# Patient Record
Sex: FEMALE | NOT HISPANIC OR LATINO
[De-identification: names, ages, dates, MRNs, and addresses within clinical notes are randomized per-mention and may not be internally consistent; named-entity substitution may affect disease eponyms.]

---

## 2019-07-22 ENCOUNTER — APPOINTMENT (OUTPATIENT)
Dept: RHEUMATOLOGY | Facility: CLINIC | Age: 64
End: 2019-07-22
Payer: COMMERCIAL

## 2019-07-22 VITALS
HEART RATE: 69 BPM | BODY MASS INDEX: 23.54 KG/M2 | RESPIRATION RATE: 17 BRPM | WEIGHT: 150 LBS | SYSTOLIC BLOOD PRESSURE: 122 MMHG | DIASTOLIC BLOOD PRESSURE: 72 MMHG | TEMPERATURE: 98.8 F | OXYGEN SATURATION: 98 % | HEIGHT: 67 IN

## 2019-07-22 DIAGNOSIS — Z87.891 PERSONAL HISTORY OF NICOTINE DEPENDENCE: ICD-10-CM

## 2019-07-22 DIAGNOSIS — Z86.39 PERSONAL HISTORY OF OTHER ENDOCRINE, NUTRITIONAL AND METABOLIC DISEASE: ICD-10-CM

## 2019-07-22 DIAGNOSIS — Z82.61 FAMILY HISTORY OF ARTHRITIS: ICD-10-CM

## 2019-07-22 DIAGNOSIS — Z80.8 FAMILY HISTORY OF MALIGNANT NEOPLASM OF OTHER ORGANS OR SYSTEMS: ICD-10-CM

## 2019-07-22 PROCEDURE — 99245 OFF/OP CONSLTJ NEW/EST HI 55: CPT

## 2019-07-22 RX ORDER — ERGOCALCIFEROL 1.25 MG/1
1.25 MG CAPSULE, LIQUID FILLED ORAL
Refills: 0 | Status: ACTIVE | COMMUNITY
Start: 2019-07-22

## 2019-07-22 NOTE — ASSESSMENT
[FreeTextEntry1] : 63 year old female presents with joint pain, swelling, and stiffness, suggestive of an inflammatory arthritis, possibly rheumatoid arthritis given the distribution of affected joints and her family history (grandmother w/ RA).  I have therefore ordered some more bloodwork as further workup.  I have also requested a copy of her recent x-rays for review.  She will follow up with me again in 2-3 weeks to review her results. In the meantime, she will try taking naproxen to help alleviate her symptoms.\par Pt also reports a hx of osteoporosis, for which she was treated previously with alendronate, though she has been off it now for several years.  I have therefore requested a copy of her most recent DEXA for review.  I will plan to discuss this further with her at her next visit.

## 2019-07-22 NOTE — DATA REVIEWED
[FreeTextEntry1] : x-rays of B/L wrists (5/9/19):  unremarkable\par \par requested copies of prior x-rays of hands and DEXA

## 2019-07-22 NOTE — CONSULT LETTER
[Dear  ___] : Dear  [unfilled], [Consult Letter:] : I had the pleasure of evaluating your patient, [unfilled]. [Please see my note below.] : Please see my note below. [Consult Closing:] : Thank you very much for allowing me to participate in the care of this patient.  If you have any questions, please do not hesitate to contact me. [Sincerely,] : Sincerely, [FreeTextEntry3] : Vel Lizarraga MD\par Rheumatology\par [facility]\par  of Medicine\par [employer] [facility] \par \par [address]\[place] [address]\par phone:  [phone]\par fax:      [phone]\par \par [address] [place] [address]\[place] [address]\par phone:  [phone]\par fax:      [phone]\par

## 2019-07-22 NOTE — HISTORY OF PRESENT ILLNESS
[Arthralgias] : arthralgias [Joint Swelling] : joint swelling [Morning Stiffness] : morning stiffness [FreeTextEntry1] : 63 year old female with PMHx as listed below reports that she has been experiencing joint pains, primarily in her hands (worst in her B/L 3rd PIP's R>L) for the past 2 years.  The pain is intermittent, worst in the mornings, and with activity.  She describes the pain as dull, 8 out of 10.  (+)swelling, amee in the B/L 3rd PIP's.  (+)AM stiffness, usually lasting about 3 hours.  She gets some relief from rubbing her hands with alcohol.  No other known alleviating factors (she has not tried any analgesics).\par No F/C, no unintentional weight loss, no night sweats, no oral ulcers, no rashes, no alopecia, no photosensitivity, no dry eyes/dry mouth, no Raynaud symptoms, no focal weakness, no dysphagia  [Weight Loss] : no weight loss [Anorexia] : no anorexia [Malaise] : no malaise [Fever] : no fever [Chills] : no chills [Fatigue] : no fatigue [Malar Facial Rash] : no malar facial rash [Skin Lesions] : no lesions [Skin Nodules] : no skin nodules [Oral Ulcers] : no oral ulcers [Cough] : no cough [Dry Mouth] : no dry mouth [Dysphagia] : no dysphagia [Shortness of Breath] : no shortness of breath [Chest Pain] : no chest pain [Joint Warmth] : no joint warmth [Joint Deformity] : no joint deformity [Decreased ROM] : no decreased range of motion [Falls] : no falls [Dyspnea] : no dyspnea [Myalgias] : no myalgias [Muscle Weakness] : no muscle weakness [Muscle Spasms] : no muscle spasms [Muscle Cramping] : no muscle cramping [Visual Changes] : no visual changes [Eye Pain] : no eye pain [Eye Redness] : no eye redness [Dry Eyes] : no dry eyes

## 2019-07-22 NOTE — PHYSICAL EXAM
[General Appearance - Alert] : alert [General Appearance - In No Acute Distress] : in no acute distress [Sclera] : the sclera and conjunctiva were normal [Outer Ear] : the ears and nose were normal in appearance [Oropharynx] : the oropharynx was normal [Neck Appearance] : the appearance of the neck was normal [Neck Cervical Mass (___cm)] : no neck mass was observed [Jugular Venous Distention Increased] : there was no jugular-venous distention [Thyroid Diffuse Enlargement] : the thyroid was not enlarged [Thyroid Nodule] : there were no palpable thyroid nodules [Auscultation Breath Sounds / Voice Sounds] : lungs were clear to auscultation bilaterally [Heart Sounds] : normal S1 and S2 [Heart Rate And Rhythm] : heart rate was normal and rhythm regular [Heart Sounds Gallop] : no gallops [Murmurs] : no murmurs [Bowel Sounds] : normal bowel sounds [Heart Sounds Pericardial Friction Rub] : no pericardial rub [Edema] : there was no peripheral edema [Abdomen Soft] : soft [Abdomen Tenderness] : non-tender [Abdomen Mass (___ Cm)] : no abdominal mass palpated [Cervical Lymph Nodes Enlarged Posterior Bilaterally] : posterior cervical [Cervical Lymph Nodes Enlarged Anterior Bilaterally] : anterior cervical [Supraclavicular Lymph Nodes Enlarged Bilaterally] : supraclavicular [No Spinal Tenderness] : no spinal tenderness [FreeTextEntry1] : (+)swelling in B/L 2nd-5th MCP's, B/L 3rd PIP's;  (+)tenderness in right 2nd-5th MCP's, B/L 3rd PIP's (R>L); pain in B/L hands upon making a fist;  normal ROM in rest of joints [Skin Turgor] : normal skin turgor [Skin Color & Pigmentation] : normal skin color and pigmentation [] : no rash [No Focal Deficits] : no focal deficits [Oriented To Time, Place, And Person] : oriented to person, place, and time [Impaired Insight] : insight and judgment were intact [Affect] : the affect was normal

## 2019-09-17 ENCOUNTER — APPOINTMENT (OUTPATIENT)
Dept: RHEUMATOLOGY | Facility: CLINIC | Age: 64
End: 2019-09-17
Payer: COMMERCIAL

## 2019-09-17 VITALS
HEART RATE: 64 BPM | HEIGHT: 67 IN | TEMPERATURE: 99.2 F | OXYGEN SATURATION: 98 % | RESPIRATION RATE: 17 BRPM | SYSTOLIC BLOOD PRESSURE: 130 MMHG | BODY MASS INDEX: 23.07 KG/M2 | WEIGHT: 147 LBS | DIASTOLIC BLOOD PRESSURE: 72 MMHG

## 2019-09-17 PROCEDURE — 99214 OFFICE O/P EST MOD 30 MIN: CPT

## 2019-09-17 NOTE — PHYSICAL EXAM
[General Appearance - Alert] : alert [General Appearance - In No Acute Distress] : in no acute distress [Outer Ear] : the ears and nose were normal in appearance [Sclera] : the sclera and conjunctiva were normal [Neck Appearance] : the appearance of the neck was normal [Oropharynx] : the oropharynx was normal [Neck Cervical Mass (___cm)] : no neck mass was observed [Thyroid Diffuse Enlargement] : the thyroid was not enlarged [Jugular Venous Distention Increased] : there was no jugular-venous distention [Thyroid Nodule] : there were no palpable thyroid nodules [Auscultation Breath Sounds / Voice Sounds] : lungs were clear to auscultation bilaterally [Heart Rate And Rhythm] : heart rate was normal and rhythm regular [Heart Sounds Gallop] : no gallops [Heart Sounds] : normal S1 and S2 [Murmurs] : no murmurs [Heart Sounds Pericardial Friction Rub] : no pericardial rub [Edema] : there was no peripheral edema [Bowel Sounds] : normal bowel sounds [Abdomen Soft] : soft [Abdomen Tenderness] : non-tender [Abdomen Mass (___ Cm)] : no abdominal mass palpated [Cervical Lymph Nodes Enlarged Anterior Bilaterally] : anterior cervical [Cervical Lymph Nodes Enlarged Posterior Bilaterally] : posterior cervical [Supraclavicular Lymph Nodes Enlarged Bilaterally] : supraclavicular [No Spinal Tenderness] : no spinal tenderness [Skin Color & Pigmentation] : normal skin color and pigmentation [Skin Turgor] : normal skin turgor [] : no rash [No Focal Deficits] : no focal deficits [Oriented To Time, Place, And Person] : oriented to person, place, and time [Impaired Insight] : insight and judgment were intact [Affect] : the affect was normal [FreeTextEntry1] : (+)swelling in B/L 2nd-5th MCP's, B/L 3rd PIP's;  (+)tenderness in right 2nd-5th MCP's, B/L 3rd PIP's (R>L); pain in B/L hands upon making a fist;  normal ROM in rest of joints

## 2019-09-17 NOTE — DATA REVIEWED
[FreeTextEntry1] : CBC, MP unremarkable\par RF 19\par CCP negative\par BERLIN negative\par ESR 2\par CRP 1.0 mg/L\par vit D 25-OH 55

## 2019-09-17 NOTE — ASSESSMENT
[FreeTextEntry1] : 63 year old female with joint pain, swelling, and stiffness, and found to have a low-positive RF - suggestive of RA.  Also w/ reported hx of osteoporosis for which she was treated previously with alendronate, though she has been off it now for several years.\par   - Trial of prednisone 10mg daily x 1 week.  If symptoms improve, will plan to discuss DMARD therapy.\par   - Requested prior DEXA results\par

## 2019-09-17 NOTE — HISTORY OF PRESENT ILLNESS
[Arthralgias] : arthralgias [Joint Swelling] : joint swelling [Morning Stiffness] : morning stiffness [FreeTextEntry1] : Feeling "the same" since last visit.  Still w/ pain/swelling in her B/L hands, primarily in her B/L 3rd PIP's, unchanged.  No new complaints. [Anorexia] : no anorexia [Weight Loss] : no weight loss [Malaise] : no malaise [Fever] : no fever [Chills] : no chills [Fatigue] : no fatigue [Malar Facial Rash] : no malar facial rash [Skin Lesions] : no lesions [Skin Nodules] : no skin nodules [Cough] : no cough [Oral Ulcers] : no oral ulcers [Dry Mouth] : no dry mouth [Dysphagia] : no dysphagia [Shortness of Breath] : no shortness of breath [Chest Pain] : no chest pain [Joint Warmth] : no joint warmth [Joint Deformity] : no joint deformity [Decreased ROM] : no decreased range of motion [Falls] : no falls [Dyspnea] : no dyspnea [Myalgias] : no myalgias [Muscle Weakness] : no muscle weakness [Muscle Spasms] : no muscle spasms [Muscle Cramping] : no muscle cramping [Visual Changes] : no visual changes [Eye Pain] : no eye pain [Eye Redness] : no eye redness [Dry Eyes] : no dry eyes

## 2019-10-31 ENCOUNTER — APPOINTMENT (OUTPATIENT)
Dept: RHEUMATOLOGY | Facility: CLINIC | Age: 64
End: 2019-10-31
Payer: COMMERCIAL

## 2019-10-31 VITALS
HEART RATE: 70 BPM | WEIGHT: 146 LBS | TEMPERATURE: 98.7 F | RESPIRATION RATE: 17 BRPM | HEIGHT: 67 IN | SYSTOLIC BLOOD PRESSURE: 138 MMHG | OXYGEN SATURATION: 98 % | DIASTOLIC BLOOD PRESSURE: 90 MMHG | BODY MASS INDEX: 22.91 KG/M2

## 2019-10-31 DIAGNOSIS — M65.9 SYNOVITIS AND TENOSYNOVITIS, UNSPECIFIED: ICD-10-CM

## 2019-10-31 DIAGNOSIS — M79.641 PAIN IN RIGHT HAND: ICD-10-CM

## 2019-10-31 DIAGNOSIS — M05.79 RHEUMATOID ARTHRITIS WITH RHEUMATOID FACTOR OF MULTIPLE SITES W/OUT ORGAN OR SYSTEMS INVOLVEMENT: ICD-10-CM

## 2019-10-31 DIAGNOSIS — M25.642 STIFFNESS OF RIGHT HAND, NOT ELSEWHERE CLASSIFIED: ICD-10-CM

## 2019-10-31 DIAGNOSIS — M79.642 PAIN IN RIGHT HAND: ICD-10-CM

## 2019-10-31 DIAGNOSIS — M81.0 AGE-RELATED OSTEOPOROSIS W/OUT CURRENT PATHOLOGICAL FRACTURE: ICD-10-CM

## 2019-10-31 DIAGNOSIS — Z79.899 OTHER LONG TERM (CURRENT) DRUG THERAPY: ICD-10-CM

## 2019-10-31 DIAGNOSIS — M25.641 STIFFNESS OF RIGHT HAND, NOT ELSEWHERE CLASSIFIED: ICD-10-CM

## 2019-10-31 DIAGNOSIS — M19.90 UNSPECIFIED OSTEOARTHRITIS, UNSPECIFIED SITE: ICD-10-CM

## 2019-10-31 PROCEDURE — 99214 OFFICE O/P EST MOD 30 MIN: CPT

## 2019-10-31 RX ORDER — HYDROXYCHLOROQUINE SULFATE 200 MG/1
200 TABLET, FILM COATED ORAL
Qty: 45 | Refills: 5 | Status: ACTIVE | COMMUNITY
Start: 2019-10-31 | End: 1900-01-01

## 2019-10-31 RX ORDER — PREDNISONE 10 MG/1
10 TABLET ORAL
Qty: 30 | Refills: 1 | Status: ACTIVE | COMMUNITY
Start: 2019-09-17 | End: 1900-01-01

## 2019-10-31 NOTE — ASSESSMENT
[FreeTextEntry1] : 63 year old female with joint pain, swelling, and stiffness, and found to have a low-positive RF - most c/w early RA.  Symptoms improved on prednisone, then recurred once it was completed.  Also w/ reported hx of osteoporosis for which she was treated previously with alendronate, though she has been off it now for several years.  Pt also w/ flexor tenosynovitis of the left 3rd finger.\par   - Start Plaquenil 300mg/day (dose based on weight).  Recommended baseline ophthalmology exam.\par   - Re-start prednisone 10mg daily for now.  Will taper off after 1-2 months.\par   - Requested prior DEXA results\par   - Recommended corticosteroid injection for flexor tenosynovitis, but pt declined.  Will re-address at next visit.\par   - ibuprofen prn.\par

## 2019-10-31 NOTE — PHYSICAL EXAM
[General Appearance - Alert] : alert [General Appearance - In No Acute Distress] : in no acute distress [Sclera] : the sclera and conjunctiva were normal [Outer Ear] : the ears and nose were normal in appearance [Oropharynx] : the oropharynx was normal [Neck Appearance] : the appearance of the neck was normal [Neck Cervical Mass (___cm)] : no neck mass was observed [Jugular Venous Distention Increased] : there was no jugular-venous distention [Thyroid Diffuse Enlargement] : the thyroid was not enlarged [Thyroid Nodule] : there were no palpable thyroid nodules [Auscultation Breath Sounds / Voice Sounds] : lungs were clear to auscultation bilaterally [Heart Rate And Rhythm] : heart rate was normal and rhythm regular [Heart Sounds] : normal S1 and S2 [Heart Sounds Gallop] : no gallops [Murmurs] : no murmurs [Heart Sounds Pericardial Friction Rub] : no pericardial rub [Edema] : there was no peripheral edema [Bowel Sounds] : normal bowel sounds [Abdomen Soft] : soft [Abdomen Tenderness] : non-tender [Abdomen Mass (___ Cm)] : no abdominal mass palpated [Cervical Lymph Nodes Enlarged Posterior Bilaterally] : posterior cervical [Cervical Lymph Nodes Enlarged Anterior Bilaterally] : anterior cervical [Supraclavicular Lymph Nodes Enlarged Bilaterally] : supraclavicular [No Spinal Tenderness] : no spinal tenderness [Skin Color & Pigmentation] : normal skin color and pigmentation [Skin Turgor] : normal skin turgor [] : no rash [No Focal Deficits] : no focal deficits [Oriented To Time, Place, And Person] : oriented to person, place, and time [Impaired Insight] : insight and judgment were intact [Affect] : the affect was normal [FreeTextEntry1] : (+)swelling in B/L 2nd-5th MCP's, B/L 3rd PIP's;  (+)tenderness in right 2nd-5th MCP's, left 3rd PIP'; pain in B/L hands upon making a fist; (+)tenderness over left 3rd flexor tendon sheath; normal ROM in rest of joints

## 2019-10-31 NOTE — HISTORY OF PRESENT ILLNESS
[Arthralgias] : arthralgias [Joint Swelling] : joint swelling [Morning Stiffness] : morning stiffness [FreeTextEntry1] : Pt reports that her joint pain/swelling had improved on prednisone then recurred once it was completed.  Currently again with pain/swelling in her B/L 3rd PIP's.  Pt also c/o pain in her left 3rd finger, which also frequently "gets stuck".\par  [Anorexia] : no anorexia [Weight Loss] : no weight loss [Malaise] : no malaise [Fever] : no fever [Chills] : no chills [Fatigue] : no fatigue [Malar Facial Rash] : no malar facial rash [Skin Lesions] : no lesions [Skin Nodules] : no skin nodules [Oral Ulcers] : no oral ulcers [Cough] : no cough [Dry Mouth] : no dry mouth [Dysphagia] : no dysphagia [Shortness of Breath] : no shortness of breath [Chest Pain] : no chest pain [Joint Warmth] : no joint warmth [Joint Deformity] : no joint deformity [Decreased ROM] : no decreased range of motion [Falls] : no falls [Dyspnea] : no dyspnea [Myalgias] : no myalgias [Muscle Weakness] : no muscle weakness [Muscle Spasms] : no muscle spasms [Muscle Cramping] : no muscle cramping [Visual Changes] : no visual changes [Eye Pain] : no eye pain [Eye Redness] : no eye redness [Dry Eyes] : no dry eyes

## 2020-01-28 ENCOUNTER — APPOINTMENT (OUTPATIENT)
Dept: RHEUMATOLOGY | Facility: CLINIC | Age: 65
End: 2020-01-28

## 2023-03-13 ENCOUNTER — OFFICE (OUTPATIENT)
Dept: URBAN - METROPOLITAN AREA CLINIC 115 | Facility: CLINIC | Age: 68
Setting detail: OPHTHALMOLOGY
End: 2023-03-13
Payer: COMMERCIAL

## 2023-03-13 DIAGNOSIS — H25.13: ICD-10-CM

## 2023-03-13 DIAGNOSIS — H01.004: ICD-10-CM

## 2023-03-13 DIAGNOSIS — H01.001: ICD-10-CM

## 2023-03-13 DIAGNOSIS — H52.13: ICD-10-CM

## 2023-03-13 PROCEDURE — 92014 COMPRE OPH EXAM EST PT 1/>: CPT | Performed by: OPHTHALMOLOGY

## 2023-03-13 PROCEDURE — 92015 DETERMINE REFRACTIVE STATE: CPT | Performed by: OPHTHALMOLOGY

## 2023-03-13 ASSESSMENT — REFRACTION_MANIFEST
OD_VA1: 20/20
OD_ADD: +2.50
OD_ADD: +2.50
OS_ADD: +2.50
OD_AXIS: 075
OS_ADD: +2.50
OS_CYLINDER: SPH
OD_VA1: 20/20
OD_SPHERE: -0.50
OD_VA1: 20/20
OS_VA1: 20/20
OU_VA: 20/20
OS_VA1: 20/20+
OS_VA1: 20/20
OS_SPHERE: PLANO
OU_VA: 20/20
OD_SPHERE: -0.50
OD_CYLINDER: -0.25
OD_SPHERE: -0.50
OD_CYLINDER: SPH
OS_SPHERE: -1.00
OS_ADD: +2.50
OS_SPHERE: PLANO
OD_ADD: +2.50
OD_CYLINDER: SPH

## 2023-03-13 ASSESSMENT — REFRACTION_AUTOREFRACTION
OD_SPHERE: -0.75
OS_SPHERE: -1.50
OS_AXIS: 177
OS_CYLINDER: -0.25

## 2023-03-13 ASSESSMENT — REFRACTION_CURRENTRX
OS_CYLINDER: -0.75
OD_SPHERE: +1.50
OS_AXIS: 025
OS_ADD: +1.50
OS_OVR_VA: 20/
OD_SPHERE: -0.50
OD_AXIS: 150
OD_OVR_VA: 20/
OS_SPHERE: +1.00
OS_SPHERE: +0.25
OS_AXIS: 037
OD_AXIS: 180
OS_OVR_VA: 20/
OS_VPRISM_DIRECTION: PROGS
OS_CYLINDER: -0.25
OD_VPRISM_DIRECTION: PROGS
OD_ADD: +2.25
OD_VPRISM_DIRECTION: PROGS
OD_CYLINDER: 0.00
OD_CYLINDER: -0.25
OD_OVR_VA: 20/
OS_ADD: +2.25
OD_OVR_VA: 20/
OD_ADD: +1.50
OS_VPRISM_DIRECTION: PROGS
OS_AXIS: 117
OS_CYLINDER: -0.25
OS_SPHERE: +0.25
OS_OVR_VA: 20/
OS_VPRISM_DIRECTION: PROGS
OD_CYLINDER: -2.00
OD_SPHERE: -0.50
OD_AXIS: 131
OD_VPRISM_DIRECTION: PROGS

## 2023-03-13 ASSESSMENT — TONOMETRY
OS_IOP_MMHG: 09
OD_IOP_MMHG: 09

## 2023-03-13 ASSESSMENT — CONFRONTATIONAL VISUAL FIELD TEST (CVF)
OS_FINDINGS: FULL
OD_FINDINGS: FULL

## 2023-03-13 ASSESSMENT — VISUAL ACUITY
OD_BCVA: 20/30
OS_BCVA: 20/20

## 2023-03-13 ASSESSMENT — SPHEQUIV_DERIVED
OD_SPHEQUIV: -0.625
OS_SPHEQUIV: -1.625

## 2023-03-13 ASSESSMENT — LID EXAM ASSESSMENTS
OS_BLEPHARITIS: LUL 1+
OD_BLEPHARITIS: RUL 1+
OD_COMMENTS: MILD SLEEVING
OS_COMMENTS: MILD SLEEVING

## 2024-03-18 ENCOUNTER — OFFICE (OUTPATIENT)
Dept: URBAN - METROPOLITAN AREA CLINIC 115 | Facility: CLINIC | Age: 69
Setting detail: OPHTHALMOLOGY
End: 2024-03-18
Payer: COMMERCIAL

## 2024-03-18 DIAGNOSIS — H01.004: ICD-10-CM

## 2024-03-18 DIAGNOSIS — H01.001: ICD-10-CM

## 2024-03-18 DIAGNOSIS — H25.13: ICD-10-CM

## 2024-03-18 PROCEDURE — 92014 COMPRE OPH EXAM EST PT 1/>: CPT | Performed by: OPHTHALMOLOGY

## 2024-03-18 ASSESSMENT — REFRACTION_CURRENTRX
OD_CYLINDER: 0.00
OS_ADD: +1.50
OD_OVR_VA: 20/
OS_OVR_VA: 20/
OD_AXIS: 131
OD_CYLINDER: -0.25
OS_AXIS: 037
OS_AXIS: 117
OS_CYLINDER: -0.25
OS_AXIS: 025
OS_VPRISM_DIRECTION: PROGS
OS_ADD: +2.25
OD_SPHERE: +1.50
OD_VPRISM_DIRECTION: PROGS
OS_SPHERE: +0.25
OD_AXIS: 180
OS_SPHERE: +0.25
OS_CYLINDER: -0.75
OS_SPHERE: +1.00
OS_VPRISM_DIRECTION: PROGS
OD_OVR_VA: 20/
OD_VPRISM_DIRECTION: PROGS
OD_SPHERE: -0.50
OS_CYLINDER: -0.25
OS_VPRISM_DIRECTION: PROGS
OD_ADD: +2.25
OD_OVR_VA: 20/
OS_OVR_VA: 20/
OD_CYLINDER: -2.00
OD_ADD: +1.50
OS_OVR_VA: 20/
OD_SPHERE: -0.50
OD_VPRISM_DIRECTION: PROGS
OD_AXIS: 150

## 2024-03-18 ASSESSMENT — REFRACTION_MANIFEST
OD_ADD: +2.50
OD_SPHERE: -0.50
OD_SPHERE: -0.50
OU_VA: 20/20
OU_VA: 20/20
OD_CYLINDER: SPH
OS_ADD: +2.50
OD_ADD: +2.50
OD_AXIS: 075
OD_ADD: +2.50
OS_CYLINDER: SPH
OS_VA1: 20/20
OS_SPHERE: PLANO
OS_VA1: 20/20+
OD_VA1: 20/20
OS_SPHERE: PLANO
OD_CYLINDER: -0.25
OD_CYLINDER: SPH
OD_VA1: 20/20
OD_VA1: 20/20
OS_ADD: +2.50
OS_SPHERE: -1.00
OS_VA1: 20/20
OS_ADD: +2.50
OD_SPHERE: -0.50

## 2024-03-18 ASSESSMENT — SPHEQUIV_DERIVED: OD_SPHEQUIV: -0.625

## 2024-03-18 ASSESSMENT — LID EXAM ASSESSMENTS
OD_COMMENTS: MILD SLEEVING
OD_BLEPHARITIS: RUL 1+
OS_BLEPHARITIS: LUL 1+
OS_COMMENTS: MILD SLEEVING

## 2025-03-25 ENCOUNTER — OFFICE (OUTPATIENT)
Dept: URBAN - METROPOLITAN AREA CLINIC 115 | Facility: CLINIC | Age: 70
Setting detail: OPHTHALMOLOGY
End: 2025-03-25
Payer: COMMERCIAL

## 2025-03-25 DIAGNOSIS — H11.153: ICD-10-CM

## 2025-03-25 DIAGNOSIS — H25.13: ICD-10-CM

## 2025-03-25 DIAGNOSIS — H01.004: ICD-10-CM

## 2025-03-25 DIAGNOSIS — H35.033: ICD-10-CM

## 2025-03-25 DIAGNOSIS — H01.001: ICD-10-CM

## 2025-03-25 PROCEDURE — 92014 COMPRE OPH EXAM EST PT 1/>: CPT | Performed by: OPHTHALMOLOGY

## 2025-03-25 PROCEDURE — 92134 CPTRZ OPH DX IMG PST SGM RTA: CPT | Performed by: OPHTHALMOLOGY

## 2025-03-25 ASSESSMENT — REFRACTION_CURRENTRX
OS_OVR_VA: 20/
OD_CYLINDER: -0.25
OS_AXIS: 037
OS_CYLINDER: -0.25
OD_CYLINDER: -2.00
OD_SPHERE: +1.50
OS_SPHERE: +0.25
OS_CYLINDER: -0.25
OD_SPHERE: -0.50
OD_ADD: +2.00
OD_AXIS: 128
OD_CYLINDER: -0.25
OS_AXIS: 117
OS_SPHERE: +0.25
OS_CYLINDER: -0.25
OD_AXIS: 180
OS_AXIS: 025
OD_OVR_VA: 20/
OD_SPHERE: -0.50
OS_AXIS: 157
OS_VPRISM_DIRECTION: PROGS
OS_SPHERE: +1.00
OS_ADD: +2.25
OS_VPRISM_DIRECTION: PROGS
OD_OVR_VA: 20/
OD_ADD: +2.25
OD_ADD: +1.50
OD_AXIS: 131
OS_CYLINDER: -0.75
OD_VPRISM_DIRECTION: PROGS
OS_ADD: +1.50
OD_AXIS: 150
OS_OVR_VA: 20/
OS_SPHERE: +0.25
OD_VPRISM_DIRECTION: PROGS
OS_OVR_VA: 20/
OS_VPRISM_DIRECTION: PROGS
OD_VPRISM_DIRECTION: PROGS
OD_CYLINDER: 0.00
OS_ADD: +2.00
OD_OVR_VA: 20/
OD_SPHERE: -0.25
OD_VPRISM_DIRECTION: PROGS
OS_VPRISM_DIRECTION: PROGS

## 2025-03-25 ASSESSMENT — REFRACTION_AUTOREFRACTION
OS_AXIS: 091
OD_SPHERE: 0.00
OD_CYLINDER: -0.50
OS_SPHERE: -1.75
OS_CYLINDER: -0.50
OD_AXIS: 097

## 2025-03-25 ASSESSMENT — REFRACTION_MANIFEST
OD_CYLINDER: -0.25
OS_ADD: +2.50
OD_SPHERE: -0.50
OD_ADD: +2.50
OU_VA: 20/20
OS_VA1: 20/20
OD_VA1: 20/20
OU_VA: 20/20
OU_VA: 20/20
OD_CYLINDER: SPH
OD_VA1: 20/20
OD_VA1: 20/20
OS_VA1: 20/20
OS_ADD: +2.50
OS_CYLINDER: -0.25
OS_ADD: +2.50
OS_SPHERE: PLANO
OD_ADD: +2.50
OD_ADD: +2.50
OS_SPHERE: PLANO
OS_CYLINDER: SPH
OD_VA1: 20/20
OD_SPHERE: -1.00
OD_CYLINDER: SPH
OS_VA1: 20/20
OD_AXIS: 075
OS_VA1: 20/20+
OD_SPHERE: -0.50
OS_SPHERE: -1.00
OD_ADD: +2.50
OS_SPHERE: -1.50
OD_SPHERE: -0.50
OD_CYLINDER: SPH
OS_ADD: +2.50
OS_AXIS: 090

## 2025-03-25 ASSESSMENT — VISUAL ACUITY
OS_BCVA: 20/25
OD_BCVA: 20/100

## 2025-03-25 ASSESSMENT — LID EXAM ASSESSMENTS
OS_COMMENTS: MILD SLEEVING
OD_COMMENTS: MILD SLEEVING
OD_BLEPHARITIS: RUL 1+
OS_BLEPHARITIS: LUL 1+

## 2025-03-25 ASSESSMENT — TONOMETRY
OS_IOP_MMHG: 11
OD_IOP_MMHG: 10

## 2025-03-25 ASSESSMENT — CONFRONTATIONAL VISUAL FIELD TEST (CVF)
OS_FINDINGS: FULL
OD_FINDINGS: FULL